# Patient Record
Sex: MALE | Race: ASIAN | Employment: FULL TIME | URBAN - METROPOLITAN AREA
[De-identification: names, ages, dates, MRNs, and addresses within clinical notes are randomized per-mention and may not be internally consistent; named-entity substitution may affect disease eponyms.]

---

## 2022-12-07 LAB
AVERAGE GLUCOSE: NORMAL
HBA1C MFR BLD: 5.1 %

## 2023-03-14 NOTE — PROGRESS NOTES
[facility], [address] [place] [address], [address] [place] [address], [address] [place] [address]      Patient:  Hussein Mosley  2000       SUBJECTIVE:  Hussein Mosley is a  24 y.o. male seen for a visit regarding the following:     Chief Complaint   Patient presents with    Consultation    Palpitations       CC: palpitations     HPI:   24 y.o. male  with a history of ADHD, hypertriglyceridemia who is here for evaluation of palpitations. Patient reports that  he has bene having palpitations for a few months. Symptoms had initially been occurring every few days without obvious triggers. Mild to moderate in severity. At times lasting for prolonged episodes, others short lived. Denies associated chest pain or chest pressure, syncope, near syncope, lightheadedness, dizziness, abdominal pain, nausea, vomiting, leg swelling. Has had occasional dyspnea at the time. No sensation of pulsation in the neck. Has been eating and drinking on a regular basis. Reports that he had been drinking energy drinks on a regular basis and has decreased his intake of these with improvement in his symptoms with less palpitations. No other alleviating or aggravating factors identified. No other associated symptoms. Has been prescribed medication for ADHD which he takes only on an as-needed basis. Does not associate irregular heartbeats or palpitations with taking these medications. No personal history of cardiovascular medical problems. Underwent monitor as below 1/27/2023 - 2/1/2023 with PCP which identified possible atrial fibrillation due to this abnormality was referred to cardiology. Family history  - Unknown (adopted)    Cardiovascular Testing:  -Holter at primary care office 1/27/2023 - 2/1/2023: Sinus rhythm with PACs (false positive for atrial fibrillation as P waves are clearly visible on the tracings). PVCs 4.7%. PACs less than 1%.     Past medical history, past surgical history, family history, social history, and medications were all reviewed with the patient today and updated as necessary. There are no problems to display for this patient. No family history on file. Social History     Tobacco Use    Smoking status: Every Day     Types: Cigarettes    Smokeless tobacco: Never   Substance Use Topics    Alcohol use: Not on file       Review of Systems   Constitutional: Negative for chills and fever. Eyes: Negative. Negative for vision loss in left eye and vision loss in right eye. Cardiovascular:  Positive for irregular heartbeat and palpitations. Negative for chest pain, dyspnea on exertion, leg swelling, near-syncope, orthopnea, paroxysmal nocturnal dyspnea and syncope. Respiratory: Negative. Negative for cough, shortness of breath, sleep disturbances due to breathing and wheezing. Endocrine: Negative. Hematologic/Lymphatic: Negative. Skin: Negative. Musculoskeletal: Negative. Gastrointestinal: Negative. Negative for abdominal pain, nausea and vomiting. Genitourinary: Negative. Neurological: Negative. Negative for dizziness and light-headedness. Psychiatric/Behavioral: Negative. Allergic/Immunologic: Negative. All other systems reviewed and are negative. PHYSICAL EXAM:    /76   Pulse 79   Ht 5' 6\" (1.676 m)   Wt 127 lb 12.8 oz (58 kg)   BMI 20.63 kg/m²     Physical Exam  Vitals reviewed. Constitutional:       General: He is not in acute distress. Appearance: Normal appearance. He is normal weight. He is not ill-appearing, toxic-appearing or diaphoretic. HENT:      Head: Normocephalic and atraumatic. Right Ear: External ear normal.      Left Ear: External ear normal.      Nose: Nose normal. No congestion. Eyes:      General: No scleral icterus. Right eye: No discharge. Left eye: No discharge. Cardiovascular:      Rate and Rhythm: Normal rate and regular rhythm. Heart sounds: Normal heart sounds. No murmur heard.     No friction rub. No gallop. Pulmonary:      Effort: Pulmonary effort is normal. No respiratory distress. Breath sounds: Normal breath sounds. No stridor. No wheezing or rales. Abdominal:      General: Abdomen is flat. There is no distension. Musculoskeletal:         General: No swelling. Normal range of motion. Right lower leg: No edema. Left lower leg: No edema. Skin:     General: Skin is warm and dry. Coloration: Skin is not jaundiced or pale. Findings: No bruising. Neurological:      General: No focal deficit present. Mental Status: He is alert and oriented to person, place, and time. Gait: Gait normal.   Psychiatric:         Mood and Affect: Mood normal.         Behavior: Behavior normal.         Thought Content: Thought content normal.         Judgment: Judgment normal.       Medical problems, medical history, and test results were reviewed with the patient today. No results found for this or any previous visit (from the past 168 hour(s)). Current Outpatient Medications:     dextroamphetamine (DEXTROSTAT) 10 MG tablet, Take 10 mg by mouth 2 times daily. , Disp: , Rfl:     ASSESSMENT/PLAN:    Cardiovascular Testing:  -Holter at primary care office 1/27/2023 - 2/1/2023: Sinus rhythm with PACs (false positive for atrial fibrillation as P waves are clearly visible on the tracings). PVCs 4.7%. PACs less than 1%. 1. Heart palpitations/PVCs  - recent monitor from PCP with evidence of 4.7% PVCS, PACs, no sustained arrhythmias. No evidence of atrial fibrillation (false positive for atrial fibrillation). - Avoid obvious triggers, for this patient appears that energy drinks may have precipitated some of his palpitations and irregular heartbeats. Strongly encouraged him to continue to avoid these drinks as well as other stimulants. Maintain adequate fluid hydration including approximately 2 L of water per day.   - echo to rule out structural heart abnormalities   - awaiting faxed copy of monitor results.   - if symptoms recur instructed patient to call so that monitor could be repeated.     2. Mixed hyperlipidemia  3. Hypertriglyceridemia  -Repeat fasting labs with PCP in about 6 months.  Limit fat intake in the diet.    - EKG personally reviewed in office today demonstrates Sinus  Rhythm , 79 BPM, PVC x 1, OTHERWISE WITHIN NORMAL LIMITS.       Problem List Items Addressed This Visit    None  Visit Diagnoses       Mixed hyperlipidemia    -  Primary    Hypertriglyceridemia        Heart palpitations        Relevant Orders    EKG 12 lead (Completed)    Transthoracic echocardiogram (TTE) complete with contrast, bubble, strain, and 3D PRN              Instructed patient go to ER or call 911/EMS should symptoms recur or worsen.     Patient has been instructed and agrees to call our office with any issues or other concerns related to their cardiac condition(s) and/or complaint(s).    Return in about 3 months (around 6/16/2023).    Arslan Horn,   3/16/2023 9:56 AM

## 2023-03-16 ENCOUNTER — INITIAL CONSULT (OUTPATIENT)
Dept: CARDIOLOGY CLINIC | Age: 23
End: 2023-03-16

## 2023-03-16 VITALS
BODY MASS INDEX: 20.54 KG/M2 | HEART RATE: 79 BPM | DIASTOLIC BLOOD PRESSURE: 76 MMHG | WEIGHT: 127.8 LBS | HEIGHT: 66 IN | SYSTOLIC BLOOD PRESSURE: 110 MMHG

## 2023-03-16 DIAGNOSIS — E78.1 HYPERTRIGLYCERIDEMIA: ICD-10-CM

## 2023-03-16 DIAGNOSIS — E78.2 MIXED HYPERLIPIDEMIA: Primary | ICD-10-CM

## 2023-03-16 DIAGNOSIS — R00.2 HEART PALPITATIONS: ICD-10-CM

## 2023-03-16 PROCEDURE — 99244 OFF/OP CNSLTJ NEW/EST MOD 40: CPT | Performed by: INTERNAL MEDICINE

## 2023-03-16 PROCEDURE — 93000 ELECTROCARDIOGRAM COMPLETE: CPT | Performed by: INTERNAL MEDICINE

## 2023-03-16 RX ORDER — DEXTROAMPHETAMINE SULFATE 10 MG/1
10 TABLET ORAL 2 TIMES DAILY
COMMUNITY
Start: 2023-02-28 | End: 2023-04-27

## 2023-03-16 ASSESSMENT — ENCOUNTER SYMPTOMS
WHEEZING: 0
COUGH: 0
EYES NEGATIVE: 1
VOMITING: 0
SHORTNESS OF BREATH: 0
NAUSEA: 0
RIGHT EYE: 0
GASTROINTESTINAL NEGATIVE: 1
LEFT EYE: 0
ABDOMINAL PAIN: 0
RESPIRATORY NEGATIVE: 1
ALLERGIC/IMMUNOLOGIC NEGATIVE: 1
SLEEP DISTURBANCES DUE TO BREATHING: 0
ORTHOPNEA: 0

## 2023-05-09 ENCOUNTER — OFFICE VISIT (OUTPATIENT)
Dept: NEUROLOGY | Age: 23
End: 2023-05-09
Payer: COMMERCIAL

## 2023-05-09 VITALS
SYSTOLIC BLOOD PRESSURE: 138 MMHG | OXYGEN SATURATION: 100 % | DIASTOLIC BLOOD PRESSURE: 84 MMHG | WEIGHT: 132 LBS | BODY MASS INDEX: 21.31 KG/M2 | HEART RATE: 78 BPM

## 2023-05-09 DIAGNOSIS — F42.9 OBSESSIVE-COMPULSIVE DISORDER, UNSPECIFIED TYPE: ICD-10-CM

## 2023-05-09 DIAGNOSIS — F90.9 ATTENTION DEFICIT HYPERACTIVITY DISORDER (ADHD), UNSPECIFIED ADHD TYPE: ICD-10-CM

## 2023-05-09 DIAGNOSIS — F95.9 TIC DISORDER: Primary | ICD-10-CM

## 2023-05-09 PROCEDURE — 99204 OFFICE O/P NEW MOD 45 MIN: CPT | Performed by: PSYCHIATRY & NEUROLOGY

## 2023-05-09 RX ORDER — CLONIDINE HYDROCHLORIDE 0.1 MG/1
0.1 TABLET ORAL 2 TIMES DAILY
Qty: 60 TABLET | Refills: 5 | Status: SHIPPED | OUTPATIENT
Start: 2023-05-09

## 2023-05-09 NOTE — PROGRESS NOTES
Demetrius Silva  [address] [place] [address] [address], [address] [facility]  Phone: [phone] Fax [phone]  Sammy Ang MD      Patient: Odilia Cushing  Provider: Sammy Ang MD    CC:   Chief Complaint   Patient presents with    New Patient     Tic disorder     Referring Provider:    History of Present Illness:     Odilia Cushing is a 24 y.o. RH male who presents for evaluation of tics. He is unaccompanied for today's visit. Patient presents for further evaluation of a tic disorder. Onset of tics and childhood as early as age 10 with a combination of both simple motor tics as well as some simple vocalizations including grunting noises. Tics have evolved over time in addition to brief moments of remittance and relapse. Current tics include excessive blinking, shoulder jerks, snapping fingers, brief stutters and other vocalizations and there have been other instances of coprolalia. No known family history of neurologic disease. Overall he seems to be managing fairly well. He works as an  for another company and he reports his co-workers are understanding. He is able to drive although sometimes when the tics flareup he will avoid driving disease concerned about his ability to maintain control of the car. He has never been on medications specifically for tics. He has been on Dextrostat for ADHD and does feel that it actually helps with his tics because he is able to stay more focused on activities throughout the day and the tics seem to subside when that is the case. He recalls a previous trial of Prozac for OCD although this did not seem to agree with him very well. He has no other real medical problems. Has a plan to see psychiatry soon with respect to other management of his ADHD/OCD and any other anxiety. He also feels that he would benefit from discussion with a therapist.      Review of Systems:   Review of Systems   Constitutional:  Negative for fever.

## 2023-05-09 NOTE — PATIENT INSTRUCTIONS
Start clonidine 0.1 mg tablets. Take 1 tablet in the morning and 1 tablet in the evening. Follow up with psychiatry. They may have some other options for you regarding counseling or cognitive behavioral therapy for tics.

## 2023-05-11 ASSESSMENT — ENCOUNTER SYMPTOMS
COUGH: 0
ABDOMINAL PAIN: 0